# Patient Record
Sex: MALE | Race: WHITE | Employment: FULL TIME | URBAN - METROPOLITAN AREA
[De-identification: names, ages, dates, MRNs, and addresses within clinical notes are randomized per-mention and may not be internally consistent; named-entity substitution may affect disease eponyms.]

---

## 2024-03-25 ENCOUNTER — APPOINTMENT (OUTPATIENT)
Age: 29
End: 2024-03-25
Payer: COMMERCIAL

## 2024-03-25 ENCOUNTER — HOSPITAL ENCOUNTER (EMERGENCY)
Age: 29
Discharge: HOME | End: 2024-03-25
Attending: EMERGENCY MEDICINE
Payer: COMMERCIAL

## 2024-03-25 VITALS
BODY MASS INDEX: 44.43 KG/M2 | OXYGEN SATURATION: 99 % | SYSTOLIC BLOOD PRESSURE: 140 MMHG | HEIGHT: 69 IN | WEIGHT: 300 LBS | DIASTOLIC BLOOD PRESSURE: 83 MMHG | HEART RATE: 90 BPM | RESPIRATION RATE: 16 BRPM | TEMPERATURE: 97.7 F

## 2024-03-25 DIAGNOSIS — M25.561 ACUTE PAIN OF RIGHT KNEE: Primary | ICD-10-CM

## 2024-03-25 PROCEDURE — 6370000000 HC RX 637 (ALT 250 FOR IP): Performed by: EMERGENCY MEDICINE

## 2024-03-25 PROCEDURE — 73562 X-RAY EXAM OF KNEE 3: CPT

## 2024-03-25 PROCEDURE — 99283 EMERGENCY DEPT VISIT LOW MDM: CPT

## 2024-03-25 RX ORDER — IBUPROFEN 600 MG/1
600 TABLET ORAL ONCE
Status: COMPLETED | OUTPATIENT
Start: 2024-03-25 | End: 2024-03-25

## 2024-03-25 RX ORDER — FLUOXETINE 10 MG/1
10 CAPSULE ORAL DAILY
COMMUNITY
Start: 2023-12-28

## 2024-03-25 RX ORDER — IBUPROFEN 800 MG/1
800 TABLET ORAL EVERY 8 HOURS PRN
Qty: 60 TABLET | Refills: 0 | Status: SHIPPED | OUTPATIENT
Start: 2024-03-25

## 2024-03-25 RX ADMIN — IBUPROFEN 600 MG: 600 TABLET, FILM COATED ORAL at 21:50

## 2024-03-25 ASSESSMENT — PAIN DESCRIPTION - FREQUENCY: FREQUENCY: INTERMITTENT

## 2024-03-25 ASSESSMENT — PAIN DESCRIPTION - DESCRIPTORS: DESCRIPTORS: SHARP

## 2024-03-25 ASSESSMENT — PAIN DESCRIPTION - LOCATION
LOCATION: KNEE
LOCATION: KNEE

## 2024-03-25 ASSESSMENT — PAIN SCALES - GENERAL
PAINLEVEL_OUTOF10: 4
PAINLEVEL_OUTOF10: 7

## 2024-03-25 ASSESSMENT — PAIN DESCRIPTION - ORIENTATION
ORIENTATION: RIGHT
ORIENTATION: RIGHT

## 2024-03-25 ASSESSMENT — PAIN - FUNCTIONAL ASSESSMENT
PAIN_FUNCTIONAL_ASSESSMENT: PREVENTS OR INTERFERES WITH MANY ACTIVE NOT PASSIVE ACTIVITIES
PAIN_FUNCTIONAL_ASSESSMENT: 0-10
PAIN_FUNCTIONAL_ASSESSMENT: 0-10

## 2024-03-25 ASSESSMENT — LIFESTYLE VARIABLES
HOW OFTEN DO YOU HAVE A DRINK CONTAINING ALCOHOL: NEVER
HOW MANY STANDARD DRINKS CONTAINING ALCOHOL DO YOU HAVE ON A TYPICAL DAY: PATIENT DOES NOT DRINK

## 2024-03-25 ASSESSMENT — PAIN DESCRIPTION - PAIN TYPE: TYPE: ACUTE PAIN

## 2024-03-26 NOTE — ED PROVIDER NOTES
[facility] EMERGENCY DEPT    CHIEF COMPLAINT  Knee Pain       HISTORY OF PRESENT ILLNESS  Francisco Michelle is a 29 y.o. male who presents to the ED with right knee pain.  He has been having pain in his right knee has been intermittent over the past 3 to 4 days.  It is worse with walking and seems to be more on the inside of his knee.  He had knee pain several years ago and saw an orthopedic surgeon and had an MRI and they told him he had a meniscal tear but he had resolution of symptoms so he did not have any intervention.  He feels like his knee is locking and catching.  He took ibuprofen earlier today which did not fully resolve his symptoms.  Denies fever, redness, injury.     I have reviewed the following from the nursing documentation:    History reviewed. No pertinent past medical history.  History reviewed. No pertinent surgical history.  History reviewed. No pertinent family history.  Social History     Socioeconomic History    Marital status:      Spouse name: Not on file    Number of children: Not on file    Years of education: Not on file    Highest education level: Not on file   Occupational History    Not on file   Tobacco Use    Smoking status: Never    Smokeless tobacco: Never   Substance and Sexual Activity    Alcohol use: Not on file    Drug use: Not on file    Sexual activity: Not on file   Other Topics Concern    Not on file   Social History Narrative    Not on file     Social Determinants of Health     Financial Resource Strain: Not on file   Food Insecurity: Not on file   Transportation Needs: Not on file   Physical Activity: Not on file   Stress: Not on file   Social Connections: Not on file   Intimate Partner Violence: Not on file   Housing Stability: Not on file     No current facility-administered medications for this encounter.     Current Outpatient Medications   Medication Sig Dispense Refill    FLUoxetine (PROZAC) 10 MG capsule Take 1 capsule by mouth daily      ibuprofen

## 2024-03-26 NOTE — DISCHARGE INSTRUCTIONS
Take ibuprofen every 8 hours as needed for pain.  Avoid activities that worsen your symptoms.  Follow-up with orthopedics.

## 2024-03-26 NOTE — ED TRIAGE NOTES
Pt c/o progressively worsening R knee pain for 4 days. No known injury. Pain makes it difficult to walk. Took 800mg of Ibuprofen today at noon but had no relief.

## 2024-03-27 ENCOUNTER — OFFICE VISIT (OUTPATIENT)
Age: 29
End: 2024-03-27
Payer: COMMERCIAL

## 2024-03-27 VITALS — BODY MASS INDEX: 43.79 KG/M2 | HEIGHT: 69 IN | WEIGHT: 295.64 LBS

## 2024-03-27 DIAGNOSIS — M67.51 PLICA SYNDROME OF RIGHT KNEE: Primary | ICD-10-CM

## 2024-03-27 PROCEDURE — 99203 OFFICE O/P NEW LOW 30 MIN: CPT | Performed by: ORTHOPAEDIC SURGERY

## 2024-03-27 PROCEDURE — 20610 DRAIN/INJ JOINT/BURSA W/O US: CPT | Performed by: ORTHOPAEDIC SURGERY

## 2024-03-27 RX ORDER — ACETAMINOPHEN 160 MG
2000 TABLET,DISINTEGRATING ORAL DAILY
COMMUNITY

## 2024-03-27 RX ORDER — LIDOCAINE HYDROCHLORIDE 10 MG/ML
5 INJECTION, SOLUTION INFILTRATION; PERINEURAL ONCE
Status: COMPLETED | OUTPATIENT
Start: 2024-03-27 | End: 2024-03-27

## 2024-03-27 RX ORDER — BETAMETHASONE SODIUM PHOSPHATE AND BETAMETHASONE ACETATE 3; 3 MG/ML; MG/ML
12 INJECTION, SUSPENSION INTRA-ARTICULAR; INTRALESIONAL; INTRAMUSCULAR; SOFT TISSUE ONCE
Status: COMPLETED | OUTPATIENT
Start: 2024-03-27 | End: 2024-03-27

## 2024-03-27 RX ORDER — MULTIVIT WITH MINERALS/LUTEIN
500 TABLET ORAL DAILY
COMMUNITY

## 2024-03-27 RX ADMIN — BETAMETHASONE SODIUM PHOSPHATE AND BETAMETHASONE ACETATE 12 MG: 3; 3 INJECTION, SUSPENSION INTRA-ARTICULAR; INTRALESIONAL; INTRAMUSCULAR; SOFT TISSUE at 14:43

## 2024-03-27 RX ADMIN — LIDOCAINE HYDROCHLORIDE 5 ML: 10 INJECTION, SOLUTION INFILTRATION; PERINEURAL at 14:42

## 2024-03-27 NOTE — PROGRESS NOTES
Date of Encounter: 3/27/2024  Patient Name:Francisco Michelle  Medical Record Number: [id number]    Chief Complaint   Patient presents with    Knee Pain     ER visit and RT knee pain        History of Present Illness:  Francisco Michelle is a 29 y.o. male here for evaluation of his right knee.  He was seen in the  ED on 3/25/24 with progressive worsening of pain the antecedent 4 days.  He has prior history of issues with the knee several years ago and had an MRI and was told that he had a small meniscal tear but the pain resolved without any surgical intervention.  His current symptoms are described below and I reviewed them with him today.  He is now experiencing some locking symptoms and catching in the knee.  He points to the anterior medial retinaculum as the source of his pain and popping.  He is ambulating without crutches.  He is accompanied by his wife.  No recent trauma he can recall.  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  He has a long history with his knee dating back to arthroscopic surgery in 2015 where he underwent a lateral release.  Review of his records show that he had an injection with Dr. Wander Oconnor in 2017.  Antonio Hall in 2021 and was referred for physical therapy.    Pain Assessment  Location of Pain: Knee  Location Modifiers: Right  Severity of Pain: 7  Quality of Pain: Popping, Locking, Sharp, Cracking, Buckling  Duration of Pain: Persistent  Frequency of Pain: Constant  Aggravating Factors: Walking, Bending, Stretching, Straightening, Exercise, Kneeling, Squatting, Stairs, Standing  Relieving Factors:  (has tried ice and heat with not help)    History reviewed. No pertinent past medical history.    Past Surgical History:   Procedure Laterality Date    APPENDECTOMY  2017    KNEE SURGERY      NOSE SURGERY         Current Outpatient Medications   Medication Sig Dispense Refill    Ascorbic Acid (VITAMIN C) 250 MG tablet Take 2 tablets by mouth daily      vitamin D (VITAMIN D3) 50

## 2024-04-01 ENCOUNTER — TELEPHONE (OUTPATIENT)
Dept: ORTHOPEDIC SURGERY | Age: 29
End: 2024-04-01

## 2024-04-01 DIAGNOSIS — M67.51 PLICA SYNDROME OF RIGHT KNEE: Primary | ICD-10-CM

## 2024-04-01 NOTE — TELEPHONE ENCOUNTER
General Question     Subject: RIGHT KNEE   Patient and /or Facility Request: Francisco Michelle   Contact Number: [phone]     PATIENT CALLING STATING THAT HE'S STILL HAVING PAIN IN HIS RIGHT KNEE AND DR SHETTY INFORMED HIM TO CALL IF THE PAIN DIDN'T STOP SO THE PATIENT COULD GET AN MRI    PLEASE ADVISE

## 2024-04-02 NOTE — TELEPHONE ENCOUNTER
Called patient, JONI stating that we are going to go ahead and order the MRI for his Right Knee, Once we get word back on approval we will let him know the number to schedule his MRI. If patient has any questions he can give us a call at [phone].

## 2024-04-03 ENCOUNTER — TELEPHONE (OUTPATIENT)
Age: 29
End: 2024-04-03

## 2024-04-03 NOTE — TELEPHONE ENCOUNTER
Called patient, talked to patient about denial for MRI. Will set up Peer 2 peer for Dr. Galan to discuss and appeal the denial. Patient understand this and will look forward to our update.

## 2024-04-22 ENCOUNTER — TELEPHONE (OUTPATIENT)
Dept: ORTHOPEDIC SURGERY | Age: 29
End: 2024-04-22

## 2024-04-22 NOTE — TELEPHONE ENCOUNTER
General Question     Subject: MRI UPDATE  Patient and /or Facility Request: Francisco Michelle   Contact Number: [phone]       PT CALLING IN DUE TO NEEDING MRI FOR HIS RT KNEE TO BE SENT TO [facility].     PLEASE CALL BACK AT THE ABOVE NUMBER LISTED

## 2024-05-03 DIAGNOSIS — M67.51 PLICA SYNDROME OF RIGHT KNEE: ICD-10-CM

## 2024-05-08 ENCOUNTER — PREP FOR PROCEDURE (OUTPATIENT)
Age: 29
End: 2024-05-08

## 2024-05-08 ENCOUNTER — OFFICE VISIT (OUTPATIENT)
Age: 29
End: 2024-05-08
Payer: COMMERCIAL

## 2024-05-08 VITALS — WEIGHT: 295 LBS | HEIGHT: 69 IN | BODY MASS INDEX: 43.69 KG/M2

## 2024-05-08 DIAGNOSIS — M67.51 PLICA SYNDROME OF RIGHT KNEE: ICD-10-CM

## 2024-05-08 DIAGNOSIS — Z01.818 PRE-OP EXAM: Primary | ICD-10-CM

## 2024-05-08 DIAGNOSIS — Z01.818 PRE-OP EXAM: ICD-10-CM

## 2024-05-08 LAB
BASOPHILS # BLD: 0.1 K/UL (ref 0–0.2)
BASOPHILS NFR BLD: 0.6 %
DEPRECATED RDW RBC AUTO: 14 % (ref 12.4–15.4)
EOSINOPHIL # BLD: 0.1 K/UL (ref 0–0.6)
EOSINOPHIL NFR BLD: 0.8 %
HCT VFR BLD AUTO: 43.6 % (ref 40.5–52.5)
HGB BLD-MCNC: 14.8 G/DL (ref 13.5–17.5)
INR PPP: 0.9 (ref 0.85–1.15)
LYMPHOCYTES # BLD: 2.9 K/UL (ref 1–5.1)
LYMPHOCYTES NFR BLD: 28.1 %
MCH RBC QN AUTO: 28.3 PG (ref 26–34)
MCHC RBC AUTO-ENTMCNC: 34.1 G/DL (ref 31–36)
MCV RBC AUTO: 83.1 FL (ref 80–100)
MONOCYTES # BLD: 0.8 K/UL (ref 0–1.3)
MONOCYTES NFR BLD: 7.3 %
NEUTROPHILS # BLD: 6.6 K/UL (ref 1.7–7.7)
NEUTROPHILS NFR BLD: 63.2 %
PLATELET # BLD AUTO: 349 K/UL (ref 135–450)
PMV BLD AUTO: 8.3 FL (ref 5–10.5)
PROTHROMBIN TIME: 12.4 SEC (ref 11.9–14.9)
RBC # BLD AUTO: 5.24 M/UL (ref 4.2–5.9)
WBC # BLD AUTO: 10.4 K/UL (ref 4–11)

## 2024-05-08 PROCEDURE — E0114 CRUTCH UNDERARM PAIR NO WOOD: HCPCS | Performed by: ORTHOPAEDIC SURGERY

## 2024-05-08 PROCEDURE — 99214 OFFICE O/P EST MOD 30 MIN: CPT | Performed by: ORTHOPAEDIC SURGERY

## 2024-05-08 RX ORDER — ACETAMINOPHEN 325 MG/1
1000 TABLET ORAL ONCE
Status: CANCELLED | OUTPATIENT
Start: 2024-05-08 | End: 2024-05-08

## 2024-05-08 RX ORDER — MELOXICAM 7.5 MG/1
7.5 TABLET ORAL ONCE
Status: CANCELLED | OUTPATIENT
Start: 2024-05-08 | End: 2024-05-08

## 2024-05-08 RX ORDER — SODIUM CHLORIDE 0.9 % (FLUSH) 0.9 %
5-40 SYRINGE (ML) INJECTION PRN
Status: CANCELLED | OUTPATIENT
Start: 2024-05-08

## 2024-05-08 RX ORDER — SODIUM CHLORIDE 9 MG/ML
INJECTION, SOLUTION INTRAVENOUS CONTINUOUS
Status: CANCELLED | OUTPATIENT
Start: 2024-05-08

## 2024-05-08 RX ORDER — SODIUM CHLORIDE 9 MG/ML
INJECTION, SOLUTION INTRAVENOUS PRN
Status: CANCELLED | OUTPATIENT
Start: 2024-05-08

## 2024-05-08 RX ORDER — TRANEXAMIC ACID 650 MG/1
1950 TABLET ORAL
Status: CANCELLED | OUTPATIENT
Start: 2024-05-08

## 2024-05-08 RX ORDER — SODIUM CHLORIDE 0.9 % (FLUSH) 0.9 %
5-40 SYRINGE (ML) INJECTION EVERY 12 HOURS SCHEDULED
Status: CANCELLED | OUTPATIENT
Start: 2024-05-08

## 2024-05-08 NOTE — PROGRESS NOTES
sensation is intact.     Skin: There are no rashes, ulcerations or lesions.     Gait: Altered stride length and decreased weight shift favoring his right side.  Sensation to light touch grossly present throughout the right lower extremity    MRI Right knee 4/27/2024:      Assessment:   Diagnosis Orders   1. Pre-op exam  Basic Metabolic Panel    CBC with Auto Differential    Protime-INR    Aluminum Crutches      2. Plica syndrome of right knee  Aluminum Crutches          Orders  Orders Placed This Encounter   Procedures    Basic Metabolic Panel    CBC with Auto Differential    Protime-INR    Aluminum Crutches     Plan:  We discussed treatment options today.  Based on his MRI findings I would recommend arthroscopic intervention for synovectomy with plica excision.  He does have some mild patellar maltracking but his tibial tubercle to trochlear groove distance is not to a level where I would consider osteotomy at this time.  He does not have any history of recurrent patellar dislocation.  We reviewed the risks, benefits and potential complications of arthroscopic surgery.  He understands these include but are not limited to: Infection, risk to neurovascular structures, persistent stiffness and pain, failure to improve his symptoms, possible need for further surgery, anesthetic misadventure and other medical surgical complications that could threaten his life or limb.  He is prepared to proceed.  We will order preoperative lab studies.  He will need crutches to use for weightbearing as tolerated postoperatively.  We reviewed the average time course for recovery and expectations.  He is comfortable with proceeding.  Will get this scheduled at his convenience for outpatient surgery at [facility].    He understands and accepts this course of care.        Simon Galan MD, FAAOS  Board Certified Orthopaedic Surgeon  [facility]  [place] & [place]    Phone:[phone]  Fax

## 2024-05-09 ENCOUNTER — TELEPHONE (OUTPATIENT)
Dept: ORTHOPEDIC SURGERY | Age: 29
End: 2024-05-09

## 2024-05-09 LAB
ANION GAP SERPL CALCULATED.3IONS-SCNC: 12 MMOL/L (ref 3–16)
BUN SERPL-MCNC: 14 MG/DL (ref 7–20)
CALCIUM SERPL-MCNC: 10 MG/DL (ref 8.3–10.6)
CHLORIDE SERPL-SCNC: 101 MMOL/L (ref 99–110)
CO2 SERPL-SCNC: 27 MMOL/L (ref 21–32)
CREAT SERPL-MCNC: 0.8 MG/DL (ref 0.9–1.3)
GFR SERPLBLD CREATININE-BSD FMLA CKD-EPI: >90 ML/MIN/{1.73_M2}
GLUCOSE SERPL-MCNC: 88 MG/DL (ref 70–99)
POTASSIUM SERPL-SCNC: 5 MMOL/L (ref 3.5–5.1)
SODIUM SERPL-SCNC: 140 MMOL/L (ref 136–145)

## 2024-05-09 NOTE — TELEPHONE ENCOUNTER
Called patient, Spoke with patient we are going to reschedule his surgery for 6/12/24 made post and pre- op appt. Patient will get his crutches the day prior to surgery on 6/11/24

## 2024-05-09 NOTE — TELEPHONE ENCOUNTER
Surgery and/or Procedure Scheduling     Contact Name: Francisco Michelle   Surgical/Procedure Request: R KNEE  Patient Contact Number: [phone]      PT REQ LORI SX TO [facility] OF 6/10 COULDN'T GET A PRE OP PHY UNTIL 6/7    PLEASE CLL PT TO ADV

## 2024-05-09 NOTE — PROGRESS NOTES
FOCUS NOTES:    DOS: 5/22/24   Surgeon: Adama    Date:  Follow up  Comment                   PAT Initials    5/9/24  Started PAT call, med list reconciled. Patient stated he wishes to reschedule surgery date, directed to speak to Dr Galan's office.     NT

## 2024-05-10 ENCOUNTER — TELEPHONE (OUTPATIENT)
Dept: ORTHOPEDIC SURGERY | Age: 29
End: 2024-05-10

## 2024-05-10 NOTE — TELEPHONE ENCOUNTER
PA requsted via [employer] by [employer] [facility]  Reference # [id number]

## 2024-05-15 NOTE — TELEPHONE ENCOUNTER
Auth: # [id number]    Date Range: 05/22/24 thru 07/20/24  Type of SX:  Outpatient  Location: [facility]  CPT: 86110   DX Code: M67.51  SX area: Rt knee  Insurance: Nir

## 2024-05-29 NOTE — PROGRESS NOTES
[facility] PRE-OPERATIVE INSTRUCTIONS       DOS: __6/12/24__        Pre-Op Instructions     Patients receiving local anesthetic only will arrive one hour prior to the procedure, all other patients will arrive 1.5 hours prior to procedure time.    [x]  A History and Physical will be required within 30 days prior to surgery date. Some patients may require cardiac or pulmonary clearance. H&P will be completed DOS for Endo/colonoscopy patients.     [x]  Reviewed Medical and Surgical history, medication list, confirmed with patient any implants, allergies, bleeding disorders, VIVIENNE and reactions to Anesthesia.    [x]  If there is a change in physical condition between now and the day of surgery, please notify your surgeon. This includes a cough, cold, fever, sore throat, nausea, vomiting and diarrhea. Also notify your surgeon if you experience dizziness, shortness of breath or blurred vision.    [x] Reviewed hx of C-Diff, MRSA, VRE and/or recent use of Antibiotics     [x]  All patients having a procedure must have a ride home by a responsible person that is over the age of 18 and ensure it is someone that we can share medical information with. After discharge, a responsible adult needs to stay with you for 24 hours. There is a limit of 2 adult visitors per room.     If unable to secure ride and/or care taker, please contact surgeon's office.      [x]  No alcohol, smoking or marijuana use 24 hours prior to surgery. Any use of recreational drugs must be stopped 5 days prior to surgery.     [x]  NPO after midnight (Any heart, BP, seizure, thyroid and breathing medications are okay to take the morning of surgery with a small sip of water 4 hours prior to procedure).    The morning of surgery, you may brush your teeth, just no swallowing water. Also, NO gum, candy, mints or ice chips.    []  For Colonoscopy's, follow prep-instructions as indicated by physician.     []  Patients with a insulin pump, keep set on

## 2024-05-30 NOTE — PROGRESS NOTES
FOCUS NOTES:    DOS: 6/12/24    Surgeon: Adama    Date:  Follow up  Comment     PAT Initials  5/30/24 Patient to have H&P on 6/7/24. PAT call has been made. NT  6/5/24  Spoke with patient to remind of H&P, which he states is     Scheduled for 6/7/24 with Dr. Clark.  Reminded of video     Appt with Dr Galan, he said is today but he did not get a    Link  Message sent to Max at Dr Kunz's office.  NT  6/10/24 H&P noted in Epic media from 6/7/24. Labs noted to have been reviewed by Dr Kunz. NT

## 2024-06-05 ENCOUNTER — TELEMEDICINE (OUTPATIENT)
Age: 29
End: 2024-06-05

## 2024-06-05 ENCOUNTER — TELEPHONE (OUTPATIENT)
Age: 29
End: 2024-06-05

## 2024-06-05 DIAGNOSIS — M67.51 PLICA SYNDROME OF RIGHT KNEE: Primary | ICD-10-CM

## 2024-06-05 NOTE — TELEPHONE ENCOUNTER
Called patients phone. Voicemail box is full and cannot accept new voicemail's.     Attempted to Call patients spouses phone - number is disconnected.     Will send my chart message, informing patient of Pre-op Virtual visit.

## 2024-06-06 NOTE — PROGRESS NOTES
Pulmonary/Chest: [x] Respiratory effort normal.  [x] No visualized signs of difficulty breathing or respiratory distress        [] Abnormal-      Musculoskeletal:   [] Normal gait with no signs of ataxia         [x] Normal range of motion of neck        [] Abnormal-       Neurological:        [x] No Facial Asymmetry (Cranial nerve 7 motor function) (limited exam to video visit)          [x] No gaze palsy        [] Abnormal-         Skin:        [] No significant exanthematous lesions or discoloration noted on facial skin         [] Abnormal-            Psychiatric:       [x] Normal Affect [x] No Hallucinations        [] Abnormal-     Other pertinent observable physical exam findings-     ASSESSMENT/PLAN:  1. Plica syndrome of right knee  We again reviewed the nature of the surgery as well as the risks, benefits and potential complications.  We reviewed his medication list and while preoperatively they will usually recommend that he hold his ibuprofen is not absolutely necessary for this type of procedure.  He is ready to proceed as scheduled.    Francisco Michelle, was evaluated through a synchronous (real-time) audio-video encounter. The patient (or guardian if applicable) is aware that this is a billable service, which includes applicable co-pays. This Virtual Visit was conducted with patient's (and/or legal guardian's) consent. Patient identification was verified, and a caregiver was present when appropriate.   The patient was located at Home: [address] [place] [address]  Provider was located at Facility (Appt Dept): [address] [place] [address]  Confirm you are appropriately licensed, registered, or certified to deliver care in the state where the patient is located as indicated above. If you are not or unsure, please re-schedule the visit: Yes, I confirm.       Total time spent on this encounter: Not billed by time    --ZOILA SHETTY MD on 6/6/2024 at 7:43 AM    An

## 2024-06-11 ENCOUNTER — TELEPHONE (OUTPATIENT)
Age: 29
End: 2024-06-11

## 2024-06-11 ENCOUNTER — ANESTHESIA EVENT (OUTPATIENT)
Age: 29
End: 2024-06-11
Payer: COMMERCIAL

## 2024-06-12 ENCOUNTER — ANESTHESIA (OUTPATIENT)
Age: 29
End: 2024-06-12
Payer: COMMERCIAL

## 2024-06-12 ENCOUNTER — HOSPITAL ENCOUNTER (OUTPATIENT)
Age: 29
Setting detail: OUTPATIENT SURGERY
Discharge: HOME | End: 2024-06-12
Attending: ORTHOPAEDIC SURGERY | Admitting: ORTHOPAEDIC SURGERY
Payer: COMMERCIAL

## 2024-06-12 VITALS
SYSTOLIC BLOOD PRESSURE: 127 MMHG | BODY MASS INDEX: 42.21 KG/M2 | WEIGHT: 285 LBS | HEIGHT: 69 IN | OXYGEN SATURATION: 95 % | DIASTOLIC BLOOD PRESSURE: 81 MMHG | TEMPERATURE: 98.2 F | RESPIRATION RATE: 14 BRPM | HEART RATE: 76 BPM

## 2024-06-12 DIAGNOSIS — M67.51 PLICA SYNDROME OF RIGHT KNEE: Primary | ICD-10-CM

## 2024-06-12 PROCEDURE — 6360000002 HC RX W HCPCS: Performed by: NURSE ANESTHETIST, CERTIFIED REGISTERED

## 2024-06-12 PROCEDURE — 7100000000 HC PACU RECOVERY - FIRST 15 MIN: Performed by: ORTHOPAEDIC SURGERY

## 2024-06-12 PROCEDURE — 7100000011 HC PHASE II RECOVERY - ADDTL 15 MIN: Performed by: ORTHOPAEDIC SURGERY

## 2024-06-12 PROCEDURE — 6370000000 HC RX 637 (ALT 250 FOR IP): Performed by: ANESTHESIOLOGY

## 2024-06-12 PROCEDURE — 6360000002 HC RX W HCPCS: Performed by: ORTHOPAEDIC SURGERY

## 2024-06-12 PROCEDURE — 6360000002 HC RX W HCPCS: Performed by: ANESTHESIOLOGY

## 2024-06-12 PROCEDURE — 3700000000 HC ANESTHESIA ATTENDED CARE: Performed by: ORTHOPAEDIC SURGERY

## 2024-06-12 PROCEDURE — 3600000004 HC SURGERY LEVEL 4 BASE: Performed by: ORTHOPAEDIC SURGERY

## 2024-06-12 PROCEDURE — 2500000003 HC RX 250 WO HCPCS: Performed by: NURSE ANESTHETIST, CERTIFIED REGISTERED

## 2024-06-12 PROCEDURE — 7100000010 HC PHASE II RECOVERY - FIRST 15 MIN: Performed by: ORTHOPAEDIC SURGERY

## 2024-06-12 PROCEDURE — 2709999900 HC NON-CHARGEABLE SUPPLY: Performed by: ORTHOPAEDIC SURGERY

## 2024-06-12 PROCEDURE — 6370000000 HC RX 637 (ALT 250 FOR IP): Performed by: ORTHOPAEDIC SURGERY

## 2024-06-12 PROCEDURE — 3600000014 HC SURGERY LEVEL 4 ADDTL 15MIN: Performed by: ORTHOPAEDIC SURGERY

## 2024-06-12 PROCEDURE — 3700000001 HC ADD 15 MINUTES (ANESTHESIA): Performed by: ORTHOPAEDIC SURGERY

## 2024-06-12 PROCEDURE — 7100000001 HC PACU RECOVERY - ADDTL 15 MIN: Performed by: ORTHOPAEDIC SURGERY

## 2024-06-12 PROCEDURE — 2580000003 HC RX 258: Performed by: ORTHOPAEDIC SURGERY

## 2024-06-12 RX ORDER — TRANEXAMIC ACID 650 MG/1
1950 TABLET ORAL
Status: DISCONTINUED | OUTPATIENT
Start: 2024-06-12 | End: 2024-06-12 | Stop reason: HOSPADM

## 2024-06-12 RX ORDER — FENTANYL CITRATE 50 UG/ML
50 INJECTION, SOLUTION INTRAMUSCULAR; INTRAVENOUS EVERY 5 MIN PRN
Status: DISCONTINUED | OUTPATIENT
Start: 2024-06-12 | End: 2024-06-12 | Stop reason: HOSPADM

## 2024-06-12 RX ORDER — BUPIVACAINE HYDROCHLORIDE 2.5 MG/ML
INJECTION, SOLUTION EPIDURAL; INFILTRATION; INTRACAUDAL PRN
Status: DISCONTINUED | OUTPATIENT
Start: 2024-06-12 | End: 2024-06-12 | Stop reason: ALTCHOICE

## 2024-06-12 RX ORDER — LIDOCAINE HYDROCHLORIDE 20 MG/ML
INJECTION, SOLUTION INTRAVENOUS PRN
Status: DISCONTINUED | OUTPATIENT
Start: 2024-06-12 | End: 2024-06-12 | Stop reason: SDUPTHER

## 2024-06-12 RX ORDER — NALOXONE HYDROCHLORIDE 0.4 MG/ML
INJECTION, SOLUTION INTRAMUSCULAR; INTRAVENOUS; SUBCUTANEOUS PRN
Status: DISCONTINUED | OUTPATIENT
Start: 2024-06-12 | End: 2024-06-12 | Stop reason: HOSPADM

## 2024-06-12 RX ORDER — SODIUM CHLORIDE 9 MG/ML
INJECTION, SOLUTION INTRAVENOUS CONTINUOUS
Status: DISCONTINUED | OUTPATIENT
Start: 2024-06-12 | End: 2024-06-12 | Stop reason: HOSPADM

## 2024-06-12 RX ORDER — SODIUM CHLORIDE 9 MG/ML
INJECTION, SOLUTION INTRAVENOUS PRN
Status: DISCONTINUED | OUTPATIENT
Start: 2024-06-12 | End: 2024-06-12 | Stop reason: HOSPADM

## 2024-06-12 RX ORDER — HYDROCODONE BITARTRATE AND ACETAMINOPHEN 5; 325 MG/1; MG/1
1 TABLET ORAL EVERY 4 HOURS PRN
Qty: 18 TABLET | Refills: 0 | Status: SHIPPED | OUTPATIENT
Start: 2024-06-12 | End: 2024-06-15

## 2024-06-12 RX ORDER — ONDANSETRON 2 MG/ML
INJECTION INTRAMUSCULAR; INTRAVENOUS PRN
Status: DISCONTINUED | OUTPATIENT
Start: 2024-06-12 | End: 2024-06-12 | Stop reason: SDUPTHER

## 2024-06-12 RX ORDER — ROCURONIUM BROMIDE 10 MG/ML
INJECTION, SOLUTION INTRAVENOUS PRN
Status: DISCONTINUED | OUTPATIENT
Start: 2024-06-12 | End: 2024-06-12 | Stop reason: SDUPTHER

## 2024-06-12 RX ORDER — MEPERIDINE HYDROCHLORIDE 25 MG/ML
12.5 INJECTION INTRAMUSCULAR; INTRAVENOUS; SUBCUTANEOUS
Status: DISCONTINUED | OUTPATIENT
Start: 2024-06-12 | End: 2024-06-12 | Stop reason: HOSPADM

## 2024-06-12 RX ORDER — PROPOFOL 10 MG/ML
INJECTION, EMULSION INTRAVENOUS PRN
Status: DISCONTINUED | OUTPATIENT
Start: 2024-06-12 | End: 2024-06-12 | Stop reason: SDUPTHER

## 2024-06-12 RX ORDER — SODIUM CHLORIDE 0.9 % (FLUSH) 0.9 %
5-40 SYRINGE (ML) INJECTION EVERY 12 HOURS SCHEDULED
Status: DISCONTINUED | OUTPATIENT
Start: 2024-06-12 | End: 2024-06-12 | Stop reason: HOSPADM

## 2024-06-12 RX ORDER — ACETAMINOPHEN 500 MG
1000 TABLET ORAL ONCE
Status: COMPLETED | OUTPATIENT
Start: 2024-06-12 | End: 2024-06-12

## 2024-06-12 RX ORDER — FENTANYL CITRATE 50 UG/ML
25 INJECTION, SOLUTION INTRAMUSCULAR; INTRAVENOUS EVERY 5 MIN PRN
Status: DISCONTINUED | OUTPATIENT
Start: 2024-06-12 | End: 2024-06-12 | Stop reason: HOSPADM

## 2024-06-12 RX ORDER — MELOXICAM 7.5 MG/1
7.5 TABLET ORAL ONCE
Status: COMPLETED | OUTPATIENT
Start: 2024-06-12 | End: 2024-06-12

## 2024-06-12 RX ORDER — ONDANSETRON 2 MG/ML
4 INJECTION INTRAMUSCULAR; INTRAVENOUS
Status: DISCONTINUED | OUTPATIENT
Start: 2024-06-12 | End: 2024-06-12 | Stop reason: HOSPADM

## 2024-06-12 RX ORDER — SODIUM CHLORIDE 0.9 % (FLUSH) 0.9 %
5-40 SYRINGE (ML) INJECTION PRN
Status: DISCONTINUED | OUTPATIENT
Start: 2024-06-12 | End: 2024-06-12 | Stop reason: HOSPADM

## 2024-06-12 RX ORDER — FENTANYL CITRATE 50 UG/ML
INJECTION, SOLUTION INTRAMUSCULAR; INTRAVENOUS PRN
Status: DISCONTINUED | OUTPATIENT
Start: 2024-06-12 | End: 2024-06-12 | Stop reason: SDUPTHER

## 2024-06-12 RX ORDER — SUCCINYLCHOLINE/SOD CL,ISO/PF 100 MG/5ML
SYRINGE (ML) INTRAVENOUS PRN
Status: DISCONTINUED | OUTPATIENT
Start: 2024-06-12 | End: 2024-06-12 | Stop reason: SDUPTHER

## 2024-06-12 RX ORDER — HYDROCODONE BITARTRATE AND ACETAMINOPHEN 5; 325 MG/1; MG/1
1 TABLET ORAL ONCE
Status: COMPLETED | OUTPATIENT
Start: 2024-06-12 | End: 2024-06-12

## 2024-06-12 RX ORDER — DEXAMETHASONE SODIUM PHOSPHATE 10 MG/ML
8 INJECTION, SOLUTION INTRAMUSCULAR; INTRAVENOUS ONCE
Status: COMPLETED | OUTPATIENT
Start: 2024-06-12 | End: 2024-06-12

## 2024-06-12 RX ORDER — MIDAZOLAM HYDROCHLORIDE 1 MG/ML
INJECTION INTRAMUSCULAR; INTRAVENOUS PRN
Status: DISCONTINUED | OUTPATIENT
Start: 2024-06-12 | End: 2024-06-12 | Stop reason: SDUPTHER

## 2024-06-12 RX ADMIN — PROPOFOL 250 MG: 10 INJECTION, EMULSION INTRAVENOUS at 08:03

## 2024-06-12 RX ADMIN — DEXAMETHASONE SODIUM PHOSPHATE 8 MG: 10 INJECTION, SOLUTION INTRAMUSCULAR; INTRAVENOUS at 07:10

## 2024-06-12 RX ADMIN — FENTANYL CITRATE 50 MCG: 50 INJECTION INTRAMUSCULAR; INTRAVENOUS at 08:57

## 2024-06-12 RX ADMIN — FENTANYL CITRATE 50 MCG: 50 INJECTION INTRAMUSCULAR; INTRAVENOUS at 09:09

## 2024-06-12 RX ADMIN — ACETAMINOPHEN 1000 MG: 500 TABLET ORAL at 07:01

## 2024-06-12 RX ADMIN — MIDAZOLAM 2 MG: 1 INJECTION INTRAMUSCULAR; INTRAVENOUS at 07:59

## 2024-06-12 RX ADMIN — LIDOCAINE HYDROCHLORIDE 100 MG: 20 INJECTION, SOLUTION INTRAVENOUS at 08:03

## 2024-06-12 RX ADMIN — SODIUM CHLORIDE: 9 INJECTION, SOLUTION INTRAVENOUS at 07:59

## 2024-06-12 RX ADMIN — ONDANSETRON 4 MG: 2 INJECTION INTRAMUSCULAR; INTRAVENOUS at 08:10

## 2024-06-12 RX ADMIN — SUGAMMADEX 300 MG: 100 INJECTION, SOLUTION INTRAVENOUS at 08:51

## 2024-06-12 RX ADMIN — DEXTROSE MONOHYDRATE 3000 MG: 5 INJECTION INTRAVENOUS at 08:07

## 2024-06-12 RX ADMIN — HYDROCODONE BITARTRATE AND ACETAMINOPHEN 1 TABLET: 5; 325 TABLET ORAL at 09:35

## 2024-06-12 RX ADMIN — TRANEXAMIC ACID 1950 MG: 650 TABLET ORAL at 07:02

## 2024-06-12 RX ADMIN — ROCURONIUM BROMIDE 35 MG: 10 INJECTION, SOLUTION INTRAVENOUS at 08:15

## 2024-06-12 RX ADMIN — MELOXICAM 7.5 MG: 7.5 TABLET ORAL at 07:02

## 2024-06-12 RX ADMIN — Medication 200 MG: at 08:03

## 2024-06-12 RX ADMIN — FENTANYL CITRATE 50 MCG: 50 INJECTION INTRAMUSCULAR; INTRAVENOUS at 08:33

## 2024-06-12 RX ADMIN — FENTANYL CITRATE 100 MCG: 50 INJECTION INTRAMUSCULAR; INTRAVENOUS at 08:03

## 2024-06-12 RX ADMIN — ROCURONIUM BROMIDE 10 MG: 10 INJECTION, SOLUTION INTRAVENOUS at 08:03

## 2024-06-12 ASSESSMENT — PAIN DESCRIPTION - DESCRIPTORS
DESCRIPTORS: SORE
DESCRIPTORS: SHARP
DESCRIPTORS: SHARP
DESCRIPTORS: SORE
DESCRIPTORS: SHARP

## 2024-06-12 ASSESSMENT — PAIN SCALES - GENERAL
PAINLEVEL_OUTOF10: 7
PAINLEVEL_OUTOF10: 5
PAINLEVEL_OUTOF10: 7
PAINLEVEL_OUTOF10: 7

## 2024-06-12 ASSESSMENT — PAIN DESCRIPTION - ORIENTATION
ORIENTATION: RIGHT

## 2024-06-12 ASSESSMENT — PAIN DESCRIPTION - LOCATION
LOCATION: KNEE

## 2024-06-12 ASSESSMENT — PAIN - FUNCTIONAL ASSESSMENT
PAIN_FUNCTIONAL_ASSESSMENT: 0-10

## 2024-06-12 ASSESSMENT — LIFESTYLE VARIABLES: SMOKING_STATUS: 0

## 2024-06-12 NOTE — BRIEF OP NOTE
Brief Postoperative Note      Patient: Francisco Michelle  YOB: 1995  MRN: [id number]    Date of Procedure: 6/12/2024    Pre-Op Diagnosis Codes:     * Plica syndrome of right knee [M67.51]    Post-Op Diagnosis: Same       Procedure(s):  RIGHT KNEE ARTHROSCOPY AND PLICA EXCISION    Surgeon(s):  Zoila Shetty MD    Assistant:  Surgical Assistant: Emma Alatorre    Anesthesia: General    Estimated Blood Loss (mL): less than 50     Complications: None    Specimens:   * No specimens in log *    Implants:  * No implants in log *      Drains: * No LDAs found *    Findings:  Infection Present At Time Of Surgery (PATOS) (choose all levels that have infection present):  No infection present  Other Findings: anterior interval scarring and thickened plica resected.    Electronically signed by ZOILA SHETTY MD on 6/12/2024 at 8:55 AM

## 2024-06-12 NOTE — ANESTHESIA POSTPROCEDURE EVALUATION
Department of Anesthesiology  Postprocedure Note    Patient: Francisco Michelle  MRN: [id number]  YOB: 1995  Date of evaluation: 6/12/2024    Procedure Summary       Date: 06/12/24 Room / Location: [address]    Anesthesia Start: 0759 Anesthesia Stop: 0904    Procedure: RIGHT KNEE ARTHROSCOPY AND PLICA EXCISION (Right) Diagnosis:       Plica syndrome of right knee      (Plica syndrome of right knee [M67.51])    Surgeons: Simon Galan MD Responsible Provider: Colten Diamond MD    Anesthesia Type: General ASA Status: 3            Anesthesia Type: General    Veronica Phase I: Veronica Score: 10    Veronica Phase II: Veronica Score: 10    Anesthesia Post Evaluation    Patient location during evaluation: PACU  Patient participation: complete - patient participated  Level of consciousness: awake and alert  Pain score: 3  Airway patency: patent  Nausea & Vomiting: no nausea and no vomiting  Cardiovascular status: blood pressure returned to baseline  Respiratory status: acceptable  Hydration status: euvolemic  Pain management: adequate    No notable events documented.

## 2024-06-12 NOTE — DISCHARGE INSTRUCTIONS
Outpatient Post-Operative Discharge Instructions for Knee Arthroscopy  Call your surgeon’s office today to schedule your follow-up appointment if not scheduled already: [phone]. See Dr. Galan in 10-14 days.  Resume your normal activities as you begin to feel better, unless otherwise instructed by your physician.  Walking restrictions: Other: weight bearing as tolerated with crutches as needed.   Driving restrictions:   Other:  see #5.   Do not drive while taking pain medication or until 24 hours after anesthesia.  Diet instructions: Start with clear liquids, progress to a light diet, then to a normal diet as tolerated. Take only clear liquids if nauseated or vomiting.  Avoid alcoholic beverages and major decision making for 24 hours after anesthesia.  Notify your physician if you have:  Excessive bleeding, drainage, redness, or other problems at the surgical site.  Persistent nausea, vomiting, or diarrhea  Severe pain after taking prescribed pain medication  Hives, rash, or itching.  Numbness or tingling, increased pain, or bluish, white, cool extremities around a cast, ace bandage or dressing.  Temperature over 100 degrees F.  If you cannot reach your physician for any concern, please go with this paper to an emergency facility nearest you.  Medication instructions:    o Medication reconciliation sheet given to patient  Special Instructions:  Rest today                  Ice packs to knee Frequency: 30 minutes every 2 hours for 48 hours  Elevate knee                 Above level of heart for: 48 hours when you are resting  Keep bandage dry and clean x2 days  Leave bandage on and intact for 2 days then remove, but leave steri-strips in place.  You may experience some bleeding / blood staining of the dressing and ace wrap, this is normal.  May beging showering 2 days after surgery over the steri-stips. Work on range of motion to the affected knee and ankle as needed.    Meniscus Tear: Exercises    Your Care  Patient Seen in: [facility] AND [facility] 

## 2024-06-12 NOTE — ADDENDUM NOTE
Addendum  created 06/12/24 1311 by Colten Diamond MD    Attestation recorded in Intraprocedure, Intraprocedure Attestations filed

## 2024-06-12 NOTE — ANESTHESIA PRE PROCEDURE
Cardiovascular:        (-) hypertension, past MI, CABG/stent and no hyperlipidemia        Rate: normal                    Neuro/Psych:      (-) seizures, TIA and CVA           GI/Hepatic/Renal:   (+) morbid obesity     (-) GERD       Endo/Other:        (-) diabetes mellitus, hypothyroidism               Abdominal:             Vascular:     - DVT and PE.      Other Findings:         Anesthesia Plan      general     ASA 3       Induction: intravenous.    MIPS: Postoperative opioids intended and Prophylactic antiemetics administered.  Anesthetic plan and risks discussed with patient.      Plan discussed with CRNA.                  This pre-anesthesia assessment may be used as a history and physical.    DOS STAFF ADDENDUM:    Pt seen and examined, chart reviewed (including anesthesia, drug and allergy history).  No interval changes to history and physical examination.  Anesthetic plan, risks, benefits, alternatives, and personnel involved discussed with patient.  Patient verbalized an understanding and agrees to proceed.      Colten Diamond MD  June 12, 2024  7:30 AM

## 2024-06-12 NOTE — OP NOTE
the trochlea showed smooth cartilage.  There were no loose bodies noted in the suprapatellar pouch.  The plica tissue was excised with combination of arthroscopic biter and a 3.5 mm motorized shaver.  This was resected to the level where full flexion and extension could occur without any abrasion against the medial femoral condyle or medial patella facet.    The knee was thoroughly irrigated.  Instruments and arthroscope were removed.  Portal sites were closed with 4-0 Monocryl and covered with Steri-Strips.  30 mL of 0.25 percent Marcaine plain were instilled for postoperative analgesia.  Dry sterile dressings were applied.  The limb was wrapped from the foot to the thigh with an Ace bandage.  The tourniquet was deflated and the patient was awakened and taken to recovery room in stable condition with toes noted to be warm and pink.    All needle and sponge counts matched the initial count per circulating and scrub personnel x2 prior ending this case.      Electronically signed by:  Zoila Shetty MD    Board Certified Orthopaedic Surgeon  [facility]  6/12/24  10:33 AM EDT      Electronically signed by ZOILA SHETTY MD on 6/12/2024 at 10:33 AM

## 2024-06-12 NOTE — INTERVAL H&P NOTE
Patient seen and examined.  I have reviewed the history and physical and examined the patient and find no relevant changes.  Surgery plan reviewed.    Site marked and consent verified.  All questions answered and antibiotic verified.    Ready for Right knee arthroscopic plica excision.          Simon Galan MD, FAAOS  Board Certified Orthopaedic Surgeon  [facility]  [place] & [place]    Phone:[phone]  Fax [phone]    06/12/24  7:40 AM

## 2024-06-26 ENCOUNTER — OFFICE VISIT (OUTPATIENT)
Age: 29
End: 2024-06-26

## 2024-06-26 VITALS — BODY MASS INDEX: 42.21 KG/M2 | HEIGHT: 69 IN | WEIGHT: 285 LBS

## 2024-06-26 DIAGNOSIS — Z98.890 S/P RIGHT KNEE ARTHROSCOPY: Primary | ICD-10-CM

## 2024-06-26 PROCEDURE — 99024 POSTOP FOLLOW-UP VISIT: CPT | Performed by: ORTHOPAEDIC SURGERY

## 2024-06-26 NOTE — PROGRESS NOTES
Francisco Michelle  [id number]  Encounter Date: 6/26/2024  YOB: 1995    Chief Complaint   Patient presents with    Post-Op Check     Knee post op 6/12       History:Mr. Francisco Michelle is here in follow up regarding his right knee.  Status post arthroscopy with plica excision 2 weeks ago.  Overall, he is doing well.  He has a little bit of stiffness in his knee and calf.  Significant pain.  No falls or injuries since his surgery.    Exam:  Ht 1.753 m (5' 9\")   Wt 129.3 kg (285 lb)   BMI 42.09 kg/m²   General: Alert and oriented x3, No acute distress, Cooperative and conversant.  Obesity Present  Mood and affect are appropriate.  Right knee: Portal sites are well-healed.  Knee effusion is minimal.  He has full active extension with flexion comfortable beyond 120 degrees.  No significant tenderness to direct palpation.  Sensation to light touch grossly present throughout the right lower extremity  Calf is soft with negative Homans' sign.  Skin: no erythema, lesions or rashes  No signs / symptoms of DVT / PE or infection    Assessment:   Diagnosis Orders   1. S/P right knee arthroscopy          Plan:  We discussed treatment options today.  He is doing well following arthroscopic excision of his plica.  His arthroscopic images were reviewed with him today.  He is currently working 2 separate jobs, 1 involves less standing and walking and he feels ready to return to that.  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  Since he is on his feet a lot at that job I will have him return on July 10.  He will continue with his home exercise program.  He will resume his exercise walking at about three quarters of a mile every other day and gradually work back to his mile and a half every day schedule.  He will follow back with me in 6 weeks to check his progress.  Certainly, if he is feeling back to full activities and not having issues he can call

## 2024-07-05 ENCOUNTER — TELEPHONE (OUTPATIENT)
Dept: ORTHOPEDIC SURGERY | Age: 29
End: 2024-07-05

## 2024-07-05 DIAGNOSIS — Z98.890 S/P RIGHT KNEE ARTHROSCOPY: Primary | ICD-10-CM

## 2024-07-05 NOTE — TELEPHONE ENCOUNTER
General Question     Subject: RIGHT KNEE   Patient and /or Facility Request: Francisco Michelle   Contact Number: [phone]     PATIENT CALLING REQUESTING A CALL BACK FROM SOMEONE IN DR SHETTY'S OFFICE STATING THAT HE'S STILL HAVING PAIN IN HIS RIGHT KNEE AND DOESN'T THINK HE'LL BE ABLE TO GO BACK TO WORK ON 7/10/24      PLEASE CALL THE PATIENT BACK AT THE ABOVE NUMBER

## 2024-07-05 NOTE — TELEPHONE ENCOUNTER
Called patient, ordered PT to help aid along with rehab, Will also push back work with letter to set him to return to work on 7/17/24

## 2024-07-08 ENCOUNTER — APPOINTMENT (OUTPATIENT)
Age: 29
End: 2024-07-08
Payer: COMMERCIAL

## 2024-07-08 ENCOUNTER — HOSPITAL ENCOUNTER (EMERGENCY)
Age: 29
Discharge: HOME | End: 2024-07-08
Attending: EMERGENCY MEDICINE
Payer: COMMERCIAL

## 2024-07-08 VITALS
BODY MASS INDEX: 27.4 KG/M2 | HEART RATE: 87 BPM | WEIGHT: 185 LBS | SYSTOLIC BLOOD PRESSURE: 129 MMHG | TEMPERATURE: 97.8 F | RESPIRATION RATE: 18 BRPM | OXYGEN SATURATION: 97 % | DIASTOLIC BLOOD PRESSURE: 82 MMHG | HEIGHT: 69 IN

## 2024-07-08 DIAGNOSIS — M79.604 RIGHT LEG PAIN: Primary | ICD-10-CM

## 2024-07-08 LAB
ANION GAP SERPL CALCULATED.3IONS-SCNC: 12 MMOL/L (ref 3–16)
BASOPHILS # BLD: 0.02 K/UL (ref 0–0.2)
BASOPHILS NFR BLD: 0 %
BUN SERPL-MCNC: 13 MG/DL (ref 7–20)
CALCIUM SERPL-MCNC: 9.5 MG/DL (ref 8.3–10.6)
CHLORIDE SERPL-SCNC: 103 MMOL/L (ref 99–110)
CO2 SERPL-SCNC: 25 MMOL/L (ref 21–32)
CREAT SERPL-MCNC: 0.9 MG/DL (ref 0.7–1.8)
D DIMER PPP FEU-MCNC: 0.47 UG/ML FEU (ref 0–0.6)
EOSINOPHIL # BLD: 0.1 K/UL (ref 0–0.6)
EOSINOPHILS RELATIVE PERCENT: 1 %
ERYTHROCYTE [DISTWIDTH] IN BLOOD BY AUTOMATED COUNT: 13 % (ref 12.4–15.4)
GFR, ESTIMATED: >90 ML/MIN/1.73M2
GLUCOSE SERPL-MCNC: 115 MG/DL (ref 70–99)
HCT VFR BLD AUTO: 43.9 % (ref 40.5–52.5)
HGB BLD-MCNC: 14.4 G/DL (ref 13.5–17.5)
IMM GRANULOCYTES # BLD AUTO: 0.08 K/UL (ref 0–0.5)
IMM GRANULOCYTES NFR BLD: 1 %
LYMPHOCYTES NFR BLD: 3.56 K/UL (ref 1–5.1)
LYMPHOCYTES RELATIVE PERCENT: 25 %
MCH RBC QN AUTO: 27.6 PG (ref 26–34)
MCHC RBC AUTO-ENTMCNC: 32.8 G/DL (ref 31–36)
MCV RBC AUTO: 84.3 FL (ref 80–100)
MONOCYTES NFR BLD: 0.71 K/UL (ref 0–1.3)
MONOCYTES NFR BLD: 5 %
NEUTROPHILS NFR BLD: 69 %
NEUTS SEG NFR BLD: 9.82 K/UL (ref 1.7–7.7)
PLATELET # BLD AUTO: 369 K/UL (ref 135–450)
PMV BLD AUTO: 9.3 FL (ref 9.4–12.4)
POTASSIUM SERPL-SCNC: 4 MMOL/L (ref 3.5–5.1)
RBC # BLD AUTO: 5.21 M/UL (ref 4.2–5.9)
SODIUM SERPL-SCNC: 140 MMOL/L (ref 136–145)
WBC OTHER # BLD: 14.3 K/UL (ref 4–11)

## 2024-07-08 PROCEDURE — 80048 BASIC METABOLIC PNL TOTAL CA: CPT

## 2024-07-08 PROCEDURE — 6370000000 HC RX 637 (ALT 250 FOR IP): Performed by: EMERGENCY MEDICINE

## 2024-07-08 PROCEDURE — 73590 X-RAY EXAM OF LOWER LEG: CPT

## 2024-07-08 PROCEDURE — 99284 EMERGENCY DEPT VISIT MOD MDM: CPT

## 2024-07-08 PROCEDURE — 85379 FIBRIN DEGRADATION QUANT: CPT

## 2024-07-08 PROCEDURE — 85025 COMPLETE CBC W/AUTO DIFF WBC: CPT

## 2024-07-08 PROCEDURE — 96374 THER/PROPH/DIAG INJ IV PUSH: CPT

## 2024-07-08 PROCEDURE — 6360000002 HC RX W HCPCS: Performed by: EMERGENCY MEDICINE

## 2024-07-08 RX ORDER — ACETAMINOPHEN 500 MG
1000 TABLET ORAL ONCE
Status: COMPLETED | OUTPATIENT
Start: 2024-07-08 | End: 2024-07-08

## 2024-07-08 RX ORDER — KETOROLAC TROMETHAMINE 15 MG/ML
15 INJECTION, SOLUTION INTRAMUSCULAR; INTRAVENOUS ONCE
Status: COMPLETED | OUTPATIENT
Start: 2024-07-08 | End: 2024-07-08

## 2024-07-08 RX ADMIN — KETOROLAC TROMETHAMINE 15 MG: 15 INJECTION, SOLUTION INTRAMUSCULAR; INTRAVENOUS at 01:46

## 2024-07-08 RX ADMIN — ACETAMINOPHEN 1000 MG: 500 TABLET ORAL at 01:20

## 2024-07-08 ASSESSMENT — PAIN DESCRIPTION - ORIENTATION
ORIENTATION: RIGHT;LOWER
ORIENTATION: RIGHT;LOWER

## 2024-07-08 ASSESSMENT — PAIN DESCRIPTION - DESCRIPTORS
DESCRIPTORS: ACHING;SHARP
DESCRIPTORS: ACHING
DESCRIPTORS: ACHING;SHARP

## 2024-07-08 ASSESSMENT — PAIN - FUNCTIONAL ASSESSMENT: PAIN_FUNCTIONAL_ASSESSMENT: 0-10

## 2024-07-08 ASSESSMENT — PAIN SCALES - GENERAL
PAINLEVEL_OUTOF10: 7

## 2024-07-08 ASSESSMENT — PAIN DESCRIPTION - LOCATION
LOCATION: LEG
LOCATION: LEG

## 2024-07-08 ASSESSMENT — PAIN DESCRIPTION - PAIN TYPE: TYPE: ACUTE PAIN

## 2024-07-08 NOTE — ED PROVIDER NOTES
HEARING AID SERVICE PROGRESS NOTE    AUDIOLOGY/HEARING AIDS:  O[employer]on Opn1 aids obtained via DVR on 2016:  size 2 85 receivers, 6mm double bass domes, ear , pro wax mini fit, 312 battery    repair warranty expires: 2020  replacement warranty expires: 2019  services/supplies covered until 2017    jw 2016    SUBJECTIVE:  Reason for visit: problems with her hearing aids. She stated that she heard some clicking sounds, and the batteries  within a few days.     OBJECTIVE:    Audiologist assessment of hearing aid(s)  Cleaned Hearing Aid Parts: microphone and   Replaced Hearing Aid Parts: , size: #2 85 for left and right, wax filter, dome, size: 6mm bass double vent  Listening Check: both impressions: in good working order.    ASSESSMENT:  I found both aids to be working well. I let the patient know that I replaced both receivers which will hopefully resolve her problems with the aids in regards to the clicking sounds. If she continues to have problems with the clicking sound or the batteries to contact the clinic. We may need to exchange her aids with new ones. Patient's aids are covered for services so the visit today was no charge.     PLAN:  Will follow-up if problems continue.     
on exam.  Considered DVT given recent surgical procedure.  No signs or symptoms of PE.  D-dimer was negative.  BMP is unremarkable.  He does have a slight leukocytosis on CBC which is potentially postsurgical in nature.  I do not see evidence of infection on exam.  Pain is not out of proportion.  I do not suspect necrotizing or deep space infection.  X-ray is unremarkable.  I suspect he may have been changing his gait to compensate for pain from his recent surgery that is causing a strain and tightness in his calf.  Recommended NSAIDs/acetaminophen, gentle stretching and follow-up with PCP or his orthopedic surgeon if there is no improvement within 1 week.    Discharge instructions including strict return precautions were given to the patient.  All questions were addressed. The patient verbalizes understanding and is in agreement with the plan.      - History obtained from: patient    - Records reviewed:   Orthopedics note from 6/26, is is s/p right knee arthroscopy for plica excision on 6/12, scheduled to return to work 7/10   Phone encounter 7/5 requesting return to work at later day. PT ordered and return to work moved to 7/17/24              - Consults:   None         I, Lizzeth Zamora MD, am the primary clinician of record.     During the patient's ED course, the patient was given:  Medications   acetaminophen (TYLENOL) tablet 1,000 mg (1,000 mg Oral Given 7/8/24 0120)   ketorolac (TORADOL) injection 15 mg (15 mg IntraVENous Given 7/8/24 0146)        Patient was given prescriptions for the following medications. I counseled the patient as to how to take these medications.  Discharge Medication List as of 7/8/2024  1:54 AM          Patient instructed to follow up with:   Simon Galan MD  [address] [place] [address] [phone]            All questions were answered and the patient/family expressed understanding and agreement with the plan.     PROCEDURES  None    CRITICAL

## 2024-07-08 NOTE — DISCHARGE INSTR - COC
Continuity of Care Form    Patient Name: Francisco Cabral   :  1995  MRN:  [id number]    Admit date:  2024  Discharge date:  ***    Code Status Order: Prior   Advance Directives:     Admitting Physician:  No admitting provider for patient encounter.  PCP: Luisa Hood MD    Discharging Nurse: ***  Discharging Hospital Unit/Room#:   Discharging Unit Phone Number: ***    Emergency Contact:   Extended Emergency Contact Information  Primary Emergency Contact: JES CABRAL  Home Phone: [phone]  Mobile Phone: [phone]  Relation: Spouse    Past Surgical History:  Past Surgical History:   Procedure Laterality Date    APPENDECTOMY  2017    KNEE ARTHROSCOPY Left     KNEE ARTHROSCOPY Right 2024    RIGHT KNEE ARTHROSCOPY AND PLICA EXCISION performed by Simon Galan MD at [facility] OR    KNEE SURGERY Right 2015    arthroscopic lateral release with Dr. Ramos    NOSE SURGERY         Immunization History:     There is no immunization history on file for this patient.    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    S/P right knee arthroscopy Z98.890       Isolation/Infection:   Isolation            No Isolation          Patient Infection Status       None to display            Nurse Assessment:  Last Vital Signs: /82   Pulse 87   Temp 97.8 °F (36.6 °C) (Oral)   Resp 18   Ht 1.753 m (5' 9\")   Wt 83.9 kg (185 lb)   SpO2 97%   BMI 27.32 kg/m²     Last documented pain score (0-10 scale): Pain Level: 7  Last Weight:   Wt Readings from Last 1 Encounters:   24 83.9 kg (185 lb)     Mental Status:  {IP PT MENTAL STATUS:58907}    IV Access:  { TYLER IV ACCESS:968358495}    Nursing Mobility/ADLs:  Walking   {CHP DME ADLs:203915332}  Transfer  {CHP DME ADLs:063765217}  Bathing  {CHP DME ADLs:051503337}  Dressing  {CHP DME ADLs:568746553}  Toileting  {CHP DME ADLs:768927429}  Feeding  {CHP DME ADLs:670819347}  Med Admin  {[facility]

## 2024-07-08 NOTE — ED TRIAGE NOTES
Pt ambulates into the ER from home with complaint of right calf pain. Pt reports having knee surgery on 6/12. Pt reports symptoms started intermittent pain since surgery but now the pain is constant for two weeks. Pt denies blood thinners and recent injury.  Pt denies medication prior to arrival.  Pt is A&OX4. Respirations are even and unlabored. Skin is warm, appropriate for ethnicity, and dry. No acute distress noted.

## 2024-07-11 ENCOUNTER — HOSPITAL ENCOUNTER (OUTPATIENT)
Dept: PHYSICAL THERAPY | Age: 29
Setting detail: THERAPIES SERIES
Discharge: HOME | End: 2024-07-11
Attending: ORTHOPAEDIC SURGERY
Payer: COMMERCIAL

## 2024-07-11 DIAGNOSIS — M25.661 DECREASED ROM OF RIGHT KNEE: ICD-10-CM

## 2024-07-11 DIAGNOSIS — R52 INCREASED PAIN: Primary | ICD-10-CM

## 2024-07-11 DIAGNOSIS — R53.1 DECREASED STRENGTH: ICD-10-CM

## 2024-07-11 PROCEDURE — 97110 THERAPEUTIC EXERCISES: CPT | Performed by: PHYSICAL THERAPIST

## 2024-07-11 PROCEDURE — 97161 PT EVAL LOW COMPLEX 20 MIN: CPT | Performed by: PHYSICAL THERAPIST

## 2024-07-11 NOTE — THERAPY EVALUATION
Met: [] Adjusted  4. Patient will return to work activities without increased symptoms or restriction.   [] Progressing: [] Met: [] Not Met: [] Adjusted  5. Patient will resume regular activities.  [] Progressing: [] Met: [] Not Met: [] Adjusted     Overall Progression Towards Functional goals/ Treatment Progress Update:  [] Patient is progressing as expected towards functional goals listed.    [] Progression is slowed due to complexities/Impairments listed.  [] Progression has been slowed due to co-morbidities.  [x] Plan just implemented, too soon (<30days) to assess goals progression   [] Goals require adjustment due to lack of progress  [] Patient is not progressing as expected and requires additional follow up with physician  [] Other:     TREATMENT PLAN     Frequency/Duration: 1-2x/week for 6 weeks for the following treatment interventions:    Interventions:  Therapeutic Exercise (49354) including: strength training, ROM, and functional mobility  Therapeutic Activities (49320) including: functional mobility training and education.  Neuromuscular Re-education (39478) activation and proprioception, including postural re-education.    Modalities as needed that may include: Cryotherapy  Patient education on joint protection, activity modification, and progression of HEP    Plan: POC initiated as per evaluation    Electronically Signed by Elina Verduzco PT  Date: 07/11/2024     Note: Portions of this note have been templated and/or copied from initial evaluation, reassessments and prior notes for documentation efficiency.    Note: If patient does not return for scheduled/recommended follow up visits, this note will serve as a discharge from care along with the most recent update on progress.    Ortho Evaluation

## 2024-07-15 ENCOUNTER — OFFICE VISIT (OUTPATIENT)
Age: 29
End: 2024-07-15

## 2024-07-15 VITALS — WEIGHT: 185 LBS | BODY MASS INDEX: 27.4 KG/M2 | HEIGHT: 69 IN

## 2024-07-15 DIAGNOSIS — Z98.890 S/P RIGHT KNEE ARTHROSCOPY: Primary | ICD-10-CM

## 2024-07-15 PROCEDURE — 99024 POSTOP FOLLOW-UP VISIT: CPT | Performed by: ORTHOPAEDIC SURGERY

## 2024-07-15 NOTE — PROGRESS NOTES
Francisco Michelle  [id number]  Encounter Date: 7/15/2024  YOB: 1995    Chief Complaint   Patient presents with    Post-Op Check     Rt Knee        History:Mr. Francisco Michelle is here in follow up regarding his right knee s/p arthroscopy and plica excision 6/12/2024.  He has been working with PT and is developed some soreness in his anterior muscular compartment in his leg particularly related to his tibialis anterior.  Overall, his knee pain is minimal.  He had significant pain radiating down his shin and calf to the point where he presented to the ED July 8.  D-dimer was negative and they had a low index of suspicion for DVT.  No recent falls.  He is slated to go back to work this Wednesday.    Exam:  Ht 1.753 m (5' 9\")   Wt 83.9 kg (185 lb)   BMI 27.32 kg/m²   General: Alert and oriented x3, No acute distress, Cooperative and conversant.  Obesity Absent   Mood and affect are appropriate.  Right knee: Portal sites remain well-healed.  Knee shows good functional range of motion 0 to 120 degrees no tenderness to direct palpation other than some mild tenderness over the fat pad.  No significant effusion.  Quad strength 4+ to 5 -/5.  Calf: Negative Homans' sign.  No palpable cords.  He does have some soreness on palpation of his tibialis anterior may be consistent with muscle strain.  No tenderness over the tibial crest.  Sensation to light touch grossly present throughout the right lower extremity  Capillary refill < 2 seconds and palpable distal pulses  Skin: no erythema, lesions or rashes      Assessment:   Diagnosis Orders   1. S/P right knee arthroscopy            Plan:  We discussed treatment options today.  He is at the average recovery level for 1 month postop.  While he does need to go back to work he understands he may still be a little sore.  I did explain to him that kneeling and crawling on this knee after surgery usually takes at least 8 to 12 weeks before it becomes more comfortable but it is

## 2024-07-16 ENCOUNTER — HOSPITAL ENCOUNTER (OUTPATIENT)
Dept: PHYSICAL THERAPY | Age: 29
Setting detail: THERAPIES SERIES
Discharge: HOME | End: 2024-07-16
Attending: ORTHOPAEDIC SURGERY
Payer: COMMERCIAL

## 2024-07-16 PROCEDURE — 97110 THERAPEUTIC EXERCISES: CPT | Performed by: PHYSICAL THERAPIST

## 2024-07-16 PROCEDURE — 97530 THERAPEUTIC ACTIVITIES: CPT | Performed by: PHYSICAL THERAPIST

## 2024-07-16 NOTE — FLOWSHEET NOTE
[facility]- Outpatient Rehabilitation and Therapy, [facility]        Physical Therapy: TREATMENT/PROGRESS NOTE   Patient: Francisco Michelle (29 y.o. male)   Examination Date: 2024   :  1995 MRN: [id number]   Visit #: 2   Insurance Allowable Auth Needed   [place] []Yes    []No    Insurance: Payor: [facility] / Plan: BCBS - [place] PPO / Product Type: *No Product type* /   Insurance ID: [id number] - ([facility])  Secondary Insurance (if applicable):    Treatment Diagnosis:    No diagnosis found.     Medical Diagnosis:  S/P right knee arthroscopy [Z98.890]   Referring Physician: Simon Galan, *  PCP: Luisa Hood MD     Plan of care signed (Y/N):     Date of Patient follow up with Physician: 7/15/24     POC update due: (10 visits /OR AUTH LIMITS, whichever is less)   2024                                             Precautions/ Contra-indications:           Latex allergy:  NO  Pacemaker:    NO  Contraindications for Manipulation: None  Date of Surgery: s/p Right knee A-scope and plica excision  24  Other:    Red Flags:  None    C-SSRS Triggered by Intake questionnaire:   Patient answered 'NO' to both behavioral questions on intake.  No further screening warranted    Preferred Language for Healthcare:   [x] English       [] other:    SUBJECTIVE EXAMINATION     Patient stated complaint:  Pt has pain in the knee along the medial patella. Pt saw the MD yesterday. Continue with the current treatment plan. Pt will RTW tomorrow.        Test used Initial score  2024   Pain Summary VAS 4-6/10    Functional questionnaire LEFS 65%    Other:              Pain:  Pain location: right knee and ankle  Patient describes pain to be intermittent and pressure  Pain decreases with: Ice  Pain increases with: Standing, Walking, Sitting, and kneeling, squatting, stairs     Relevant Medical History: depression; chewing tobacco    Living status: pt does not live alone. 14 stairs.

## 2024-07-25 ENCOUNTER — HOSPITAL ENCOUNTER (OUTPATIENT)
Dept: PHYSICAL THERAPY | Age: 29
Setting detail: THERAPIES SERIES
Discharge: HOME | End: 2024-07-25
Attending: ORTHOPAEDIC SURGERY
Payer: COMMERCIAL

## 2024-07-25 PROCEDURE — 97530 THERAPEUTIC ACTIVITIES: CPT | Performed by: PHYSICAL THERAPIST

## 2024-07-25 PROCEDURE — 97110 THERAPEUTIC EXERCISES: CPT | Performed by: PHYSICAL THERAPIST

## 2024-07-25 NOTE — FLOWSHEET NOTE
[facility]- Outpatient Rehabilitation and Therapy, [facility]        Physical Therapy: TREATMENT/PROGRESS NOTE   Patient: Francisco Michelle (29 y.o. male)   Examination Date: 2024   :  1995 MRN: [id number]   Visit #: 3   Insurance Allowable Auth Needed   [place] []Yes    []No    Insurance: Payor: [facility] / Plan: BCBS - [place] PPO / Product Type: *No Product type* /   Insurance ID: [id number] - ([facility])  Secondary Insurance (if applicable):    Treatment Diagnosis:    No diagnosis found.     Medical Diagnosis:  S/P right knee arthroscopy [Z98.890]   Referring Physician: Simon Galan, *  PCP: Luisa Hood MD     Plan of care signed (Y/N):     Date of Patient follow up with Physician: 24 and 8/15/24     POC update due: (10 visits /OR AUTH LIMITS, whichever is less)   2024                                             Precautions/ Contra-indications:           Latex allergy:  NO  Pacemaker:    NO  Contraindications for Manipulation: None  Date of Surgery: s/p Right knee A-scope and plica excision  24  Other:    Red Flags:  None    C-SSRS Triggered by Intake questionnaire:   Patient answered 'NO' to both behavioral questions on intake.  No further screening warranted    Preferred Language for Healthcare:   [x] English       [] other:    SUBJECTIVE EXAMINATION     Patient stated complaint: 6 weeks post-op. Patient has pain in the ankle and traveling up to the knee. He has been more active with RTW this week. The ankle is stiff today.  Pt snaps the leg into extension after prolonged flexion because it feels stuck. Pt takes Ibuprofen.        Test used Initial score  2024   Pain Summary VAS 4-6/10    Functional questionnaire LEFS 65%    Other:              Pain:  Pain location: right knee and ankle  Patient describes pain to be intermittent and pressure  Pain decreases with: Ice  Pain increases with: Standing, Walking, Sitting, and kneeling, squatting,

## 2024-08-01 ENCOUNTER — HOSPITAL ENCOUNTER (OUTPATIENT)
Dept: PHYSICAL THERAPY | Age: 29
Setting detail: THERAPIES SERIES
Discharge: HOME | End: 2024-08-01
Attending: ORTHOPAEDIC SURGERY
Payer: COMMERCIAL

## 2024-08-01 PROCEDURE — 97530 THERAPEUTIC ACTIVITIES: CPT | Performed by: PHYSICAL THERAPIST

## 2024-08-01 PROCEDURE — 97110 THERAPEUTIC EXERCISES: CPT | Performed by: PHYSICAL THERAPIST

## 2024-08-01 NOTE — FLOWSHEET NOTE
[facility]- Outpatient Rehabilitation and Therapy, [facility]        Physical Therapy: TREATMENT/PROGRESS NOTE   Patient: Francisco Michelle (29 y.o. male)   Examination Date: 2024   :  1995 MRN: [id number]   Visit #: 4   Insurance Allowable Auth Needed   [place] []Yes    []No    Insurance: Payor: [facility] / Plan: BCBS - [place] PPO / Product Type: *No Product type* /   Insurance ID: [id number] - ([facility])  Secondary Insurance (if applicable):    Treatment Diagnosis:    No diagnosis found.     Medical Diagnosis:  S/P right knee arthroscopy [Z98.890]   Referring Physician: Simon Galan, *  PCP: Luisa Hood MD     Plan of care signed (Y/N):     Date of Patient follow up with Physician: 24 and 8/15/24     POC update due: (10 visits /OR AUTH LIMITS, whichever is less)   2024                                             Precautions/ Contra-indications:           Latex allergy:  NO  Pacemaker:    NO  Contraindications for Manipulation: None  Date of Surgery: s/p Right knee A-scope and plica excision  24  Other:    Red Flags:  None    C-SSRS Triggered by Intake questionnaire:   Patient answered 'NO' to both behavioral questions on intake.  No further screening warranted    Preferred Language for Healthcare:   [x] English       [] other:    SUBJECTIVE EXAMINATION     Patient stated complaint: Patient continues to have catching in the PF joint. Knee is feeling better this week. Less discomfort after exercises and therapy. Pt rides the recumbent bike 4x/week for 20 minutes. +HEP.        Test used Initial score  2024   Pain Summary VAS 4-6/10    Functional questionnaire LEFS 65%    Other:              Pain:  Pain location: right knee and ankle  Patient describes pain to be intermittent and pressure  Pain decreases with: Ice  Pain increases with: Standing, Walking, Sitting, and kneeling, squatting, stairs     Relevant Medical History: depression; chewing

## 2024-08-08 ENCOUNTER — OFFICE VISIT (OUTPATIENT)
Age: 29
End: 2024-08-08

## 2024-08-08 ENCOUNTER — HOSPITAL ENCOUNTER (OUTPATIENT)
Age: 29
Setting detail: THERAPIES SERIES
Discharge: HOME | End: 2024-08-08
Attending: ORTHOPAEDIC SURGERY
Payer: COMMERCIAL

## 2024-08-08 VITALS — WEIGHT: 185 LBS | BODY MASS INDEX: 27.4 KG/M2 | HEIGHT: 69 IN

## 2024-08-08 DIAGNOSIS — Z98.890 S/P RIGHT KNEE ARTHROSCOPY: Primary | ICD-10-CM

## 2024-08-08 PROCEDURE — 97530 THERAPEUTIC ACTIVITIES: CPT | Performed by: PHYSICAL THERAPIST

## 2024-08-08 PROCEDURE — 97112 NEUROMUSCULAR REEDUCATION: CPT | Performed by: PHYSICAL THERAPIST

## 2024-08-08 PROCEDURE — 97110 THERAPEUTIC EXERCISES: CPT | Performed by: PHYSICAL THERAPIST

## 2024-08-08 NOTE — FLOWSHEET NOTE
[facility]- Outpatient Rehabilitation and Therapy, [facility]        Physical Therapy: TREATMENT/PROGRESS NOTE   Patient: Francisco Michelle (29 y.o. male)   Examination Date: 2024   :  1995 MRN: [id number]   Visit #: 5   Insurance Allowable Auth Needed   [place] []Yes    []No    Insurance: Payor: [facility] / Plan: BCBS - [place] PPO / Product Type: *No Product type* /   Insurance ID: [id number] - ([facility])  Secondary Insurance (if applicable):    Treatment Diagnosis:    No diagnosis found.     Medical Diagnosis:  S/P right knee arthroscopy [Z98.890]  Right knee pain [M25.561]   Referring Physician: Simon Galan, *  PCP: Luisa Hood MD     Plan of care signed (Y/N):     Date of Patient follow up with Physician: 24      POC update due: (10 visits /OR AUTH LIMITS, whichever is less)   2024                                             Precautions/ Contra-indications:           Latex allergy:  NO  Pacemaker:    NO  Contraindications for Manipulation: None  Date of Surgery: s/p Right knee A-scope and plica excision  24  Other:    Red Flags:  None    C-SSRS Triggered by Intake questionnaire:   Patient answered 'NO' to both behavioral questions on intake.  No further screening warranted    Preferred Language for Healthcare:   [x] English       [] other:    SUBJECTIVE EXAMINATION     Patient stated complaint: Patient has discomfort along the lateral knee/ITB and occasionally the PF joint. Catches and requires \"snapping it\" into extension. Pt has been walking and working out at the gym.He saw the MD today, and he would like for Francisco to continue PT for strengthening.         Test used Initial score  2024   Pain Summary VAS 4-6/10    Functional questionnaire LEFS 65%    Other:              Pain:  Pain location: right knee and ankle  Patient describes pain to be intermittent and pressure  Pain decreases with: Ice  Pain increases with: Standing, Walking, Sitting, and

## 2024-08-10 NOTE — PROGRESS NOTES
Francisco Michelle  [id number]  Encounter Date: 8/8/2024  YOB: 1995    Chief Complaint   Patient presents with    Post-Op Check     Post op Right knee        History:Mr. Francisco Michelle is here in follow up regarding his right knee s/p arthroscopic plica excision 6/12/24.  He is now 8 weeks postop.  He has been working with physical therapy.  He did walk 2 miles yesterday and reports minimal problems.  He is still using ibuprofen 800 mg as needed.  He stated that when he was sitting in a car traveling out of state the knee did get stiff on him and caused some more pain until he got up and started moving again.  No new falls or injuries.    Exam:  Ht 1.753 m (5' 9\")   Wt 83.9 kg (185 lb)   BMI 27.32 kg/m²   General: Alert and oriented x3, No acute distress, Cooperative and conversant.  Mood and affect are appropriate.  Right knee: Portal sites remain well-healed with no significant effusion.  He has full extension and flexion.  There is some trace crepitation on active knee extension.  Quad strength 4+/5.  Sensation to light touch grossly present throughout the right lower extremity  Capillary refill < 2 seconds   Skin: no erythema, lesions or rashes  Gait: He ambulates throughout the office with tandem gait no significant limp.    Assessment:   Diagnosis Orders   1. S/P right knee arthroscopy            Plan:  We discussed treatment options today.  He is now 8 weeks postop.  He is making expected progress with physical therapy.  He will finish out his planned PT appointments.  He can continue working on strengthening of his quad.  Overall, he is progressing as expected and at this point he can follow back with me on an as-needed basis.    He understands and accepts this course of care.      Simon Galan MD, FAAOS  Board Certified Orthopaedic Surgeon  [facility]  [place] [address] [place]    Phone:[phone]  Fax [phone]    08/10/24  4:01 PM    Documentation was done using

## 2024-08-13 ENCOUNTER — HOSPITAL ENCOUNTER (OUTPATIENT)
Age: 29
Setting detail: THERAPIES SERIES
Discharge: HOME | End: 2024-08-13
Attending: ORTHOPAEDIC SURGERY

## 2024-08-13 PROCEDURE — 97110 THERAPEUTIC EXERCISES: CPT | Performed by: PHYSICAL THERAPIST

## 2024-08-13 PROCEDURE — 97530 THERAPEUTIC ACTIVITIES: CPT | Performed by: PHYSICAL THERAPIST

## 2024-08-13 NOTE — THERAPY RECERTIFICATION
[facility]- Outpatient Rehabilitation and Therapy, [facility]    Physical Therapy Re-Certification Plan of Care    Dear Simon Galan, *  ,    We had the pleasure of treating the following patient for physical therapy services at [facility] Outpatient Physical Therapy. A summary of our findings can be found in the updated assessment below.  This includes our plan of care.  If you have any questions or concerns regarding these findings, please do not hesitate to contact me at the office phone number checked above.  Thank you for the referral.     Physician Signature:________________________________Date:__________________  By signing above (or electronic signature), therapist's plan is approved by physician      Functional Outcome: LEFS= 65%  Francisco Michelle 1995 continues to present with functional deficits in strength symmetry, flexibility, and endurance of strength  limiting ability with transitions between positions, carrying items, and lifting items .  During therapy this date, patient required verbal cueing and modification of technique for exercise progression, improving proper muscle recruitment and activation/motor control patterns, and modulating pain. Patient will continue to benefit from ongoing evaluation and advanced clinical decision from a Physical Therapist to improve pain control, muscle strength, neuromuscular control, endurance, and balance and proprioception to safely return to work/work related tasks and recreational activity without symptoms or restrictions.    Overall Response to Treatment:  Patient is responding well to treatment and improvement is noted with regards to goals    Total Visits: 6     Recommendation:    [x] Continue PT 1x / wk for 4 weeks.   [] Hold PT, pending MD visit   [] Discharge to [facility]. Follow up with PT or MD PRN.      Physical Therapy: TREATMENT/PROGRESS NOTE   Patient: Francisco Michelle (29 y.o. male)   Examination Date: 2024   :  1995 MRN:

## 2024-08-20 ENCOUNTER — HOSPITAL ENCOUNTER (OUTPATIENT)
Age: 29
Setting detail: THERAPIES SERIES
Discharge: HOME | End: 2024-08-20
Attending: ORTHOPAEDIC SURGERY

## 2024-08-20 PROCEDURE — 97110 THERAPEUTIC EXERCISES: CPT | Performed by: PHYSICAL THERAPIST

## 2024-08-20 PROCEDURE — 97530 THERAPEUTIC ACTIVITIES: CPT | Performed by: PHYSICAL THERAPIST

## 2024-08-20 NOTE — FLOWSHEET NOTE
[facility]- Outpatient Rehabilitation and Therapy, [address] [address] Phone: [phone]    Physical Therapy: TREATMENT/PROGRESS NOTE   Patient: Francisco Michelle (29 y.o. male)   Examination Date: 2024   :  1995 MRN: [id number]   Visit #: 7   Insurance Allowable Auth Needed   [place]  7/15/24 to 10/12/24  12 visits []Yes    []No    Insurance: Payor: /   Insurance ID: No Subscriber Number on File  Secondary Insurance (if applicable):    Treatment Diagnosis:    No diagnosis found.     Medical Diagnosis:  S/P right knee arthroscopy [Z98.890]   Referring Physician: Simon Galan, *  PCP: Luisa Hood MD     Plan of care signed (Y/N):     Date of Patient follow up with Physician:      POC update due: (10 visits /OR AUTH LIMITS, whichever is less)   2024                                             Precautions/ Contra-indications:           Latex allergy:  NO  Pacemaker:    NO  Contraindications for Manipulation: None  Date of Surgery: s/p Right knee A-scope and plica excision  24  Other:    Red Flags:  None    C-SSRS Triggered by Intake questionnaire:   Patient answered 'NO' to both behavioral questions on intake.  No further screening warranted    Preferred Language for Healthcare:   [x] English       [] other:    SUBJECTIVE EXAMINATION     Patient stated complaint: 10 weeks post-op. Patient fell getting out of the shower and hit the patella on a cabinet. It is sore.        Test used Initial score  2024   Pain Summary VAS 4-6/10    Functional questionnaire LEFS 65%    Other:              Pain:  Pain location: right knee and ankle  Patient describes pain to be intermittent and pressure  Pain decreases with: Ice  Pain increases with: Standing, Walking, Sitting, and kneeling, squatting, stairs     Relevant Medical History: depression; chewing tobacco    Living status: pt does not live alone. 14 stairs.     Occupation/School:  Work/School Status:

## 2024-08-27 ENCOUNTER — HOSPITAL ENCOUNTER (OUTPATIENT)
Age: 29
Setting detail: THERAPIES SERIES
Discharge: HOME | End: 2024-08-27
Attending: ORTHOPAEDIC SURGERY

## 2024-08-27 PROCEDURE — 97110 THERAPEUTIC EXERCISES: CPT | Performed by: PHYSICAL THERAPIST

## 2024-08-27 PROCEDURE — 97530 THERAPEUTIC ACTIVITIES: CPT | Performed by: PHYSICAL THERAPIST

## 2024-08-27 NOTE — FLOWSHEET NOTE
motion, maintain or improve muscular strength or increase flexibility, following either an injury or surgery.    GOALS     Patient stated goal: pain reduced. Regular activities  [x] Progressing: [] Met: [] Not Met: [] Adjusted    Therapist goals for Patient:   Short Term Goals: To be achieved in: 2 weeks  1. Independent in HEP and progression per patient tolerance, in order to prevent re-injury.   [] Progressing: [x] Met: [] Not Met: [] Adjusted  2. Patient will have a decrease in pain to <2/10 to facilitate improvement in movement, function, and ADLs as indicated by Functional Deficits.  [x] Progressing: [] Met: [] Not Met: [] Adjusted    Long Term Goals: To be achieved in: 6 weeks  1. Disability index score of 20% or less for the LEFS to assist with reaching prior level of function with activities such as standing and walking.  [x] Progressing: [] Met: [] Not Met: [] Adjusted  2. Patient will demonstrate increased AROM of right knee to 130 degrees without pain to allow for proper joint functioning to enable patient to kneeling, squatting, stairs.   [] Progressing: [x] Met: [] Not Met: [] Adjusted  3. Patient will demonstrate increased Strength of right LE to at least 4/5 throughout without pain to allow for proper functional mobility to enable patient to return to normal activities.   [x] Progressing: [] Met: [] Not Met: [] Adjusted  4. Patient will return to work activities without increased symptoms or restriction.   [x] Progressing: [] Met: [] Not Met: [] Adjusted  5. Patient will resume regular activities.  [x] Progressing: [] Met: [] Not Met: [] Adjusted     Overall Progression Towards Functional goals/ Treatment Progress Update:  [x] Patient is progressing as expected towards functional goals listed.    [] Progression is slowed due to complexities/Impairments listed.  [] Progression has been slowed due to co-morbidities.  [] Plan just implemented, too soon (<30days) to assess goals progression   [] Goals  require adjustment due to lack of progress  [] Patient is not progressing as expected and requires additional follow up with physician  [] Other:     TREATMENT PLAN     Frequency/Duration: 1-2x/week for 6 weeks for the following treatment interventions:    Interventions:  Therapeutic Exercise (83099) including: strength training, ROM, and functional mobility  Therapeutic Activities (02852) including: functional mobility training and education.  Neuromuscular Re-education (20548) activation and proprioception, including postural re-education.    Modalities as needed that may include: Cryotherapy  Patient education on joint protection, activity modification, and progression of HEP    Plan: ROM, stretching, strengthening, endurance, and balance program.     Electronically Signed by Elina Verduzco PT  Date: 08/27/2024     Note: Portions of this note have been templated and/or copied from initial evaluation, reassessments and prior notes for documentation efficiency.    Note: If patient does not return for scheduled/recommended follow up visits, this note will serve as a discharge from care along with the most recent update on progress.    Ortho Evaluation

## 2024-09-10 ENCOUNTER — HOSPITAL ENCOUNTER (OUTPATIENT)
Age: 29
Setting detail: THERAPIES SERIES
Discharge: HOME | End: 2024-09-10
Attending: ORTHOPAEDIC SURGERY

## 2024-09-10 PROCEDURE — 97530 THERAPEUTIC ACTIVITIES: CPT | Performed by: PHYSICAL THERAPIST

## 2024-09-10 PROCEDURE — 97110 THERAPEUTIC EXERCISES: CPT | Performed by: PHYSICAL THERAPIST

## 2024-09-24 ENCOUNTER — HOSPITAL ENCOUNTER (OUTPATIENT)
Age: 29
Setting detail: THERAPIES SERIES
Discharge: HOME | End: 2024-09-24
Attending: ORTHOPAEDIC SURGERY

## 2024-09-24 PROCEDURE — 97530 THERAPEUTIC ACTIVITIES: CPT | Performed by: PHYSICAL THERAPIST

## 2024-09-24 PROCEDURE — 97110 THERAPEUTIC EXERCISES: CPT | Performed by: PHYSICAL THERAPIST

## (undated) DEVICE — COVER,MAYO STAND,XL,STERILE: Brand: MEDLINE

## (undated) DEVICE — 3M™ STERI-STRIP™ REINFORCED ADHESIVE SKIN CLOSURES, R1547, 1/2 IN X 4 IN (12 MM X 100 MM), 6 STRIPS/ENVELOPE: Brand: 3M™ STERI-STRIP™

## (undated) DEVICE — LIGHT HANDLE: Brand: DEVON

## (undated) DEVICE — ZIMMER® STERILE DISPOSABLE TOURNIQUET CUFF WITH PLC, DUAL PORT, SINGLE BLADDER, 34 IN. (86 CM)

## (undated) DEVICE — MERCY HEALTH WEST TURNOVER: Brand: MEDLINE INDUSTRIES, INC.

## (undated) DEVICE — PAD ABSRB W8XL10IN ABD HYDROPHOBIC NONWOVEN THCK LAYR CELOS

## (undated) DEVICE — APPLICATOR MEDICATED 26 CC SOLUTION HI LT ORNG CHLORAPREP

## (undated) DEVICE — GLOVE ORANGE PI 8 1/2   MSG9085

## (undated) DEVICE — SPONGE LAP W18XL18IN WHT COT 4 PLY FLD STRUNG RADPQ DISP ST 2 PER PACK

## (undated) DEVICE — [AGGRESSIVE PLUS CUTTER, ARTHROSCOPIC SHAVER BLADE,  DO NOT RESTERILIZE,  DO NOT USE IF PACKAGE IS DAMAGED,  KEEP DRY,  KEEP AWAY FROM SUNLIGHT]: Brand: FORMULA

## (undated) DEVICE — SET IRRIG L94IN ID0.281IN W/ 4.5IN DST FLX CONN 2 LD ON OFF

## (undated) DEVICE — LEGGINGS, PAIR, 31X48, STERILE: Brand: MEDLINE

## (undated) DEVICE — 4-PORT MANIFOLD: Brand: NEPTUNE 2

## (undated) DEVICE — Device

## (undated) DEVICE — TUBING, SUCTION, 1/4" X 10', STRAIGHT: Brand: MEDLINE

## (undated) DEVICE — BASIC SINGLE BASIN 1-LF: Brand: MEDLINE INDUSTRIES, INC.

## (undated) DEVICE — DYONICS 4.0 MM ELITE                                    ACROMIOBLASTER STRAIGHT DISPOSABLE                                    BURRS, SAGE GREEN, 10000 MAXIMUM                                    RPM, PACKAGED 6 PER BOX, STERILE

## (undated) DEVICE — GOWN,AURORA,NONREINF,RAGLAN,XXL,STERILE: Brand: MEDLINE

## (undated) DEVICE — COVER,MAYO STAND,STERILE: Brand: MEDLINE

## (undated) DEVICE — DEVON TUBE HOLDER REMOVABLE TOUCH FASTEN STRAP: Brand: DEVON

## (undated) DEVICE — SUTURE MONOCRYL + SZ 4-0 L18IN ABSRB UD L19MM PS-2 3/8 CIR MCP496G

## (undated) DEVICE — GLOVE SURG SZ 8.5 L11.85IN FNGR THK9.8MIL STRW LTX POLYMER

## (undated) DEVICE — SHEET,DRAPE,53X77,STERILE: Brand: MEDLINE

## (undated) DEVICE — NEEDLE SPNL L3.5IN PNK HUB S STL REG WALL FIT STYL W/ QNCKE